# Patient Record
Sex: MALE | Race: WHITE | Employment: FULL TIME | URBAN - METROPOLITAN AREA
[De-identification: names, ages, dates, MRNs, and addresses within clinical notes are randomized per-mention and may not be internally consistent; named-entity substitution may affect disease eponyms.]

---

## 2018-03-16 ENCOUNTER — OFFICE VISIT (OUTPATIENT)
Dept: SURGERY | Age: 23
End: 2018-03-16

## 2018-03-16 VITALS
DIASTOLIC BLOOD PRESSURE: 70 MMHG | HEIGHT: 70 IN | BODY MASS INDEX: 33.93 KG/M2 | SYSTOLIC BLOOD PRESSURE: 120 MMHG | WEIGHT: 237 LBS

## 2018-03-16 DIAGNOSIS — D17.1 LIPOMA OF TORSO: Primary | ICD-10-CM

## 2018-03-16 PROCEDURE — 99241 PR OFFICE CONSULTATION NEW/ESTAB PATIENT 15 MIN: CPT | Performed by: SURGERY

## 2018-03-16 ASSESSMENT — ENCOUNTER SYMPTOMS
ALLERGIC/IMMUNOLOGIC NEGATIVE: 1
GASTROINTESTINAL NEGATIVE: 1
RESPIRATORY NEGATIVE: 1
EYES NEGATIVE: 1

## 2018-03-16 NOTE — LETTER
[address]  Phone: [phone]  Fax: [phone]    Gara Kussmaul, MD        March 16, 2018     Alia Gustanislav, [address] [address]    Patient: Brooks Amador  MR Number: [id number]  YOB: 1995  Date of Visit: 3/16/2018    Dear Dr. Alia Ivory: Thank you for the request for consultation for Brooks Amador. Below are the relevant portions of my assessment and plan of care. Assessment:     Pleasant 50-year-old male who presents for evaluation of a subcutaneous mass of left chest wall which has been present for 2 months. The mass has not significantly changed in size but does cause localized discomfort. Clinically, the patient is a 15 mm lipoma of the left chest wall. Plan:     Excision of lipoma of the left chest wall under local anesthetic in the office. If you have questions, please do not hesitate to call me. I look forward to following Della Izaguirre along with you.     Sincerely,        Gara Kussmaul, MD

## 2018-03-16 NOTE — PROGRESS NOTES
Subjective:      Surinder Boo is a 20 y.o. male     CC: Subcutaneous mass of the left chest    HPI: 76-year-old male who presents for evaluation of a subcu mass of the left chest wall. The mass has been present for about 2 months. It has not changed in size but causes localized discomfort. Family History   Problem Relation Age of Onset    Asthma Father        History reviewed. No pertinent past medical history. History reviewed. No pertinent surgical history. Prior to Visit Medications    Medication Sig Taking? Authorizing Provider   meloxicam (MOBIC) 15 MG tablet Take 1 tablet by mouth daily  Bee Winn MD   escitalopram (LEXAPRO) 10 MG tablet Take 1 tablet by mouth daily  Bee Winn MD       Social History     Social History    Marital status: Single     Spouse name: N/A    Number of children: N/A    Years of education: N/A     Occupational History    Not on file. Social History Main Topics    Smoking status: Former Smoker     Start date: 2/1/2015    Smokeless tobacco: Never Used    Alcohol use 0.0 oz/week    Drug use: No    Sexual activity: Yes     Partners: Female     Other Topics Concern    Not on file     Social History Narrative    No narrative on file       Review of Systems   Constitutional: Negative. HENT: Negative. Eyes: Negative. Respiratory: Negative. Cardiovascular: Negative. Gastrointestinal: Negative. Endocrine: Negative. Genitourinary: Negative. Musculoskeletal: Negative. Skin: Positive for pallor. Allergic/Immunologic: Negative. Neurological: Negative. Hematological: Negative. Psychiatric/Behavioral: Negative. Objective:   Physical Exam   Constitutional: He is oriented to person, place, and time. He appears well-developed and well-nourished. HENT:   Head: Normocephalic and atraumatic.    Right Ear: External ear normal.   Left Ear: External ear normal.   Eyes: Conjunctivae and EOM are normal.

## 2018-03-19 ENCOUNTER — PROCEDURE VISIT (OUTPATIENT)
Dept: SURGERY | Age: 23
End: 2018-03-19

## 2018-03-19 VITALS — SYSTOLIC BLOOD PRESSURE: 142 MMHG | DIASTOLIC BLOOD PRESSURE: 80 MMHG

## 2018-03-19 DIAGNOSIS — D17.79 LIPOMA OF OTHER SPECIFIED SITES: Primary | ICD-10-CM

## 2018-03-19 PROBLEM — D17.9 LIPOMA: Status: ACTIVE | Noted: 2018-03-19

## 2018-03-19 PROCEDURE — 21930 EXC BACK LES SC < 3 CM: CPT | Performed by: SURGERY

## 2021-10-26 ENCOUNTER — TELEPHONE (OUTPATIENT)
Dept: FAMILY MEDICINE CLINIC | Age: 26
End: 2021-10-26

## 2021-10-26 NOTE — TELEPHONE ENCOUNTER
----- Message from Jamie Alves sent at 10/26/2021 10:27 AM EDT -----  Subject: Message to Provider    QUESTIONS  Information for Provider? Pt would like to reestablish with Dr Jesse Zuniga. Pt was last seen in 05/2016. Pts insurance is now through [employer]   ---------------------------------------------------------------------------  --------------  [address]  What is the best way for the office to contact you? OK to leave message on   voicemail  Preferred Call Back Phone Number? [phone]  ---------------------------------------------------------------------------  --------------  SCRIPT ANSWERS  Relationship to Patient?  Self